# Patient Record
Sex: FEMALE | Race: WHITE
[De-identification: names, ages, dates, MRNs, and addresses within clinical notes are randomized per-mention and may not be internally consistent; named-entity substitution may affect disease eponyms.]

---

## 2019-09-09 NOTE — P.OP_ITS
Operative Date/Time/Diagnoses    
Date of procedure: 09/11/19    
Time of procedure: 14:15    
    
 Procedure & Clinicians    
Procedure: Preoperative diagnoses:    
1.  Right nuclear sclerotic cataract    
2.  Astigmatism which is to be corrected with a toric intraocular lens implant.    
Postoperative diagnoses:    
1.  Cataract removal with phacoemulsification with toric posterior chamber   
intraocular lens implant placed.    
    
Procedure:  Phacoemulsification with posterior chamber toric intraocular lens   
implant.    
    
Surgeon:  Mayra Fisher MD    
    
Complications:  None    
    
Specimen:  None    
    
Implant: [id number] Axis 090    
    
Blood loss:  None    
    
Anesthesia:  Retrobulbar with monitored standby    
    
Description of procedure:  Patient  presents with a complaint of decreased   
vision due to cataract which is affecting activities of daily living. The   
patient wants surgery to improve vision and elects to correct  astigmatism as   
well.    
    
The patient was taken to the operating room and proparacaine drops placed.   
Indelible ink marks were placed at the 90 and 180 degree meridian.  The patient   
was placed on the operating room table and given IV sedation.  A retrobulbar   
block insert consisting of 6 cc of 2% xylocaine without epinephrine mixed half   
and half with 0.5% Marcaine with 1 cc of hyaluronidase added is placed between   
the medial and lateral 1/3 of the inferior orbital rim.The eye is manually   
massaged for 30 sec, prepped using Betadine solution, and draped in the usual   
sterile fashion.    
    
Temporal approach was made, a 1 mm side-port incision was made 90? from the   
proposed corneal wound.  Phenylephrine 1.5% mixed with 1% xylocaine 0.2 cc was   
placed into the anterior chamber. Viscoat followed by Yunier was then placed. A   
2.6 mm clear incision with a 2.6 mm blade was placed at the 170 degree meridian.  
 A 360 degree capsulorrhexis style capsulotomy was then performed with a   
cystitome needle on a Healon.  Hydrodelineation and hydrodissection were   
performed. The phacoemulsification unit is introduced, and sculpting  used to   
groove the central lens. It is then removed in chopping mode.  Epi nucleus is   
removed with epinuclear mode and irrigation aspiration was used to remove the   
peripheral cortex.  The posterior capsule is polished.  The intraocular lens is   
selected, inspected, power confirmed, and placed in the posterior chamber at the  
desired meridian of 90?. The pupil was not constricted.  The wound was stromally  
hydrated and tested for leaks, there was none and it was left sutureless.    
Vigamox 0.1 cc was placed into the anterior chamber. Kenalog 0.2 cc was placed   
in the superior subconjunctival space.  A drop of antibiotic and was placed and   
the eye was patched and shielded. The patient was stable and returned to the   
recovery room in excellent condition.    
    
Dictated by:  Mayra Fisher MD    
    
Copy to:  [facility]

## 2019-09-09 NOTE — P.OP.PRE_ITS
Pre-operative Note    
Interval Note    
History & Physical reviewed/Exam performed by Physician: Yes    
Changes to H&P: No

## 2019-09-09 NOTE — PM.OP.1
Operative Date/Time/Diagnoses
Date of procedure: 09/11/19
Time of procedure: 14:15

Procedure & Clinicians
Procedure: Preoperative diagnoses:
1.  Right nuclear sclerotic cataract
2.  Astigmatism which is to be corrected with a toric intraocular lens implant.
Postoperative diagnoses:
1.  Cataract removal with phacoemulsification with toric posterior chamber intraocular lens implant placed.

Procedure:  Phacoemulsification with posterior chamber toric intraocular lens implant.

Surgeon:  Mayra Fisher MD

Complications:  None

Specimen:  None

Implant: [id number] Axis 090

Blood loss:  None

Anesthesia:  Retrobulbar with monitored standby

Description of procedure:  Patient  presents with a complaint of decreased vision due to cataract which is affecting activities of daily living. The patient wants surgery to improve vision and elects to correct  astigmatism as well.

The patient was taken to the operating room and proparacaine drops placed. Indelible ink marks were placed at the 90 and 180 degree meridian.  The patient was placed on the operating room table and given IV sedation.  A retrobulbar block insert 
consisting of 6 cc of 2% xylocaine without epinephrine mixed half and half with 0.5% Marcaine with 1 cc of hyaluronidase added is placed between the medial and lateral 1/3 of the inferior orbital rim.The eye is manually massaged for 30 sec, prepped 
using Betadine solution, and draped in the usual sterile fashion.

Temporal approach was made, a 1 mm side-port incision was made 90? from the proposed corneal wound.  Phenylephrine 1.5% mixed with 1% xylocaine 0.2 cc was placed into the anterior chamber. Viscoat followed by Yunier was then placed. A 2.6 mm clear 
incision with a 2.6 mm blade was placed at the 170 degree meridian.  A 360 degree capsulorrhexis style capsulotomy was then performed with a cystitome needle on a Healon.  Hydrodelineation and hydrodissection were performed. The phacoemulsification 
unit is introduced, and sculpting  used to groove the central lens. It is then removed in chopping mode.  Epi nucleus is removed with epinuclear mode and irrigation aspiration was used to remove the peripheral cortex.  The posterior capsule is 
polished.  The intraocular lens is selected, inspected, power confirmed, and placed in the posterior chamber at the desired meridian of 90?. The pupil was not constricted.  The wound was stromally hydrated and tested for leaks, there was none and it 
was left sutureless.  Vigamox 0.1 cc was placed into the anterior chamber. Kenalog 0.2 cc was placed in the superior subconjunctival space.  A drop of antibiotic and was placed and the eye was patched and shielded. The patient was stable and 
returned to the recovery room in excellent condition.

Dictated by:  Mayra Fisher MD

Copy to:  [facility]

## 2019-09-11 ENCOUNTER — HOSPITAL ENCOUNTER (OUTPATIENT)
Dept: HOSPITAL 73 - OR | Age: 64
Discharge: HOME | End: 2019-09-11
Payer: COMMERCIAL

## 2019-09-11 VITALS
TEMPERATURE: 97.4 F | HEART RATE: 59 BPM | SYSTOLIC BLOOD PRESSURE: 157 MMHG | RESPIRATION RATE: 16 BRPM | DIASTOLIC BLOOD PRESSURE: 89 MMHG | OXYGEN SATURATION: 100 %

## 2019-09-11 VITALS
RESPIRATION RATE: 16 BRPM | DIASTOLIC BLOOD PRESSURE: 97 MMHG | HEART RATE: 54 BPM | TEMPERATURE: 97.34 F | SYSTOLIC BLOOD PRESSURE: 146 MMHG

## 2019-09-11 VITALS — BODY MASS INDEX: 23.8 KG/M2

## 2019-09-11 DIAGNOSIS — H52.201: ICD-10-CM

## 2019-09-11 DIAGNOSIS — H25.11: Primary | ICD-10-CM

## 2019-09-11 PROCEDURE — 66984 XCAPSL CTRC RMVL W/O ECP: CPT

## 2019-09-11 PROCEDURE — V2787 ASTIGMATISM-CORRECT FUNCTION: HCPCS

## 2019-09-11 RX ADMIN — BALANCED SALT SOLUTION 15 ML: 6.4; .75; .48; .3; 3.9; 1.7 SOLUTION OPHTHALMIC at 16:55

## 2019-09-11 RX ADMIN — MOXIFLOXACIN PF 5 MG: 5 INJECTION, SOLUTION OPHTHALMIC at 16:54

## 2019-09-11 RX ADMIN — PROPARACAINE HYDROCHLORIDE 2 DROPS: 5 SOLUTION/ DROPS OPHTHALMIC at 15:21

## 2019-09-11 RX ADMIN — LIDOCAINE HYDROCHLORIDE 0 ML: 20 INJECTION, SOLUTION INFILTRATION; PERINEURAL at 16:56

## 2019-09-11 RX ADMIN — Medication 3 DROPS: at 15:29

## 2019-09-11 RX ADMIN — BALANCED SALT SOLUTION 0 ML: 6.4; .75; .48; .3; 3.9; 1.7 SOLUTION OPHTHALMIC at 16:55

## 2019-09-11 RX ADMIN — TRIAMCINOLONE ACETONIDE 3 MG: 10 INJECTION, SUSPENSION INTRA-ARTICULAR; INTRALESIONAL at 16:54

## 2019-09-11 RX ADMIN — ERYTHROMYCIN 1 APPLIC: 5 OINTMENT OPHTHALMIC at 16:58

## 2019-09-11 RX ADMIN — Medication 10 MG: at 16:55

## 2019-09-11 RX ADMIN — Medication 0.2 ML: at 16:53

## 2019-09-11 RX ADMIN — SODIUM CHONDROITIN SULFATE / SODIUM HYALURONATE 1.05 ML: 0.55-0.5 INJECTION INTRAOCULAR at 16:54

## 2019-09-21 NOTE — P.OP_ITS
Operative Date/Time/Diagnoses    
Date of procedure: 09/25/19    
Time of procedure: 13:15    
    
 Procedure & Clinicians    
Procedure: Preoperative diagnoses:    
1.  Left Cortical and nuclear sclerotic cataract    
2.  Astigmatism which is to be corrected with a toric intraocular lens implant.    
3. Multiple sclerosis.    
4. Depression    
5. Short axial length.    
Postoperative diagnoses:    
1.  Cataract removal with phacoemulsification with toric posterior chamber   
intraocular lens implant placed.    
    
Procedure:  Phacoemulsification with posterior chamber toric intraocular lens   
implant.    
    
Surgeon:  Mayra Fisher MD    
    
Complications:  None    
    
Specimen:  None    
    
Implant: [id number] axis 086    
    
Blood loss:  None    
    
Anesthesia:  Retrobulbar with monitored standby    
    
Description of procedure:  Patient  presents with a complaint of decreased   
vision due to cataract which is affecting activities of daily living. The   
patient wants surgery to improve vision and astigmatism.    
    
The patient was taken to the operating room and proparacaine drops placed.   
Indelible ink marks were placed at the 90 and 180 degree meridian.  The patient   
was placed on the operating room table and given IV sedation.  A retrobulbar   
block insert consisting of 6 cc of 2% xylocaine without epinephrine mixed half   
and half with 0.5% Marcaine with 1 cc of hyaluronidase added is placed between   
the medial and lateral 1/3 of the inferior orbital rim.    The eye is manually   
massaged for 30 sec, prepped using Betadine solution, and draped in the usual   
sterile fashion.    
    
Temporal approach was made, a 1 mm side-port incision was made 90? from the   
proposed corneal wound.  Phenylephrine 1.5% mixed with 1% xylocaine 0.2 cc was   
placed into the anterior chamber. Viscoat followed by Yunier was then placed. A   
2.6 mm clear incision with a 2.6 mm blade was placed at the 170 degree meridian.  
 A 360 degree capsulorrhexis style capsulotomy was then performed with a   
cystitome needle on a Healon.  Hydrodelineation and hydrodissection were   
performed. The phacoemulsification unit is introduced, and sculpting  used to   
groove the central lens. It is then removed in chopping mode.  Epi nucleus is   
removed with epinuclear mode and irrigation aspiration was used to remove the   
peripheral cortex.  The posterior capsule is polished.  The intraocular lens is   
selected, inspected, power confirmed, and placed in the posterior chamber at the  
desired meridian of 086 degrees. The pupil was not constricted.  The wound was   
stromally hydrated and tested for leaks, there was none and it was left   
sutureless.  Vigamox 0.1 cc was placed into the anterior chamber. Kenalog 0.2 cc  
was placed in the superior subconjunctival space.  A drop of antibiotic and was   
placed and the eye was patched and shielded. The patient was stable and returned  
to the recovery room in excellent condition.    
    
Dictated by:  Mayra Fisher MD    
    
Copy to:  [facility]

## 2019-09-21 NOTE — PM.OP.1
Operative Date/Time/Diagnoses
Date of procedure: 09/25/19
Time of procedure: 13:15

Procedure & Clinicians
Procedure: Preoperative diagnoses:
1.  Left Cortical and nuclear sclerotic cataract
2.  Astigmatism which is to be corrected with a toric intraocular lens implant.
3. Multiple sclerosis.
4. Depression
5. Short axial length.
Postoperative diagnoses:
1.  Cataract removal with phacoemulsification with toric posterior chamber intraocular lens implant placed.

Procedure:  Phacoemulsification with posterior chamber toric intraocular lens implant.

Surgeon:  Mayra Fisher MD

Complications:  None

Specimen:  None

Implant: [id number]+22.5 axis 086

Blood loss:  None

Anesthesia:  Retrobulbar with monitored standby

Description of procedure:  Patient  presents with a complaint of decreased vision due to cataract which is affecting activities of daily living. The patient wants surgery to improve vision and astigmatism.

The patient was taken to the operating room and proparacaine drops placed. Indelible ink marks were placed at the 90 and 180 degree meridian.  The patient was placed on the operating room table and given IV sedation.  A retrobulbar block insert 
consisting of 6 cc of 2% xylocaine without epinephrine mixed half and half with 0.5% Marcaine with 1 cc of hyaluronidase added is placed between the medial and lateral 1/3 of the inferior orbital rim.    The eye is manually massaged for 30 sec, 
prepped using Betadine solution, and draped in the usual sterile fashion.

Temporal approach was made, a 1 mm side-port incision was made 90? from the proposed corneal wound.  Phenylephrine 1.5% mixed with 1% xylocaine 0.2 cc was placed into the anterior chamber. Viscoat followed by Yunier was then placed. A 2.6 mm clear 
incision with a 2.6 mm blade was placed at the 170 degree meridian.  A 360 degree capsulorrhexis style capsulotomy was then performed with a cystitome needle on a Healon.  Hydrodelineation and hydrodissection were performed. The phacoemulsification 
unit is introduced, and sculpting  used to groove the central lens. It is then removed in chopping mode.  Epi nucleus is removed with epinuclear mode and irrigation aspiration was used to remove the peripheral cortex.  The posterior capsule is 
polished.  The intraocular lens is selected, inspected, power confirmed, and placed in the posterior chamber at the desired meridian of 086 degrees. The pupil was not constricted.  The wound was stromally hydrated and tested for leaks, there was 
none and it was left sutureless.  Vigamox 0.1 cc was placed into the anterior chamber. Kenalog 0.2 cc was placed in the superior subconjunctival space.  A drop of antibiotic and was placed and the eye was patched and shielded. The patient was stable 
and returned to the recovery room in excellent condition.

Dictated by:  Mayra Fisher MD

Copy to:  [facility]

## 2019-09-25 ENCOUNTER — HOSPITAL ENCOUNTER (OUTPATIENT)
Dept: HOSPITAL 73 - OR | Age: 64
Discharge: HOME | End: 2019-09-25
Payer: COMMERCIAL

## 2019-09-25 VITALS
DIASTOLIC BLOOD PRESSURE: 73 MMHG | OXYGEN SATURATION: 100 % | SYSTOLIC BLOOD PRESSURE: 130 MMHG | RESPIRATION RATE: 18 BRPM | HEART RATE: 55 BPM | TEMPERATURE: 97.88 F

## 2019-09-25 VITALS
OXYGEN SATURATION: 100 % | SYSTOLIC BLOOD PRESSURE: 149 MMHG | RESPIRATION RATE: 15 BRPM | DIASTOLIC BLOOD PRESSURE: 85 MMHG | TEMPERATURE: 97.9 F | HEART RATE: 59 BPM

## 2019-09-25 VITALS — BODY MASS INDEX: 23.8 KG/M2

## 2019-09-25 DIAGNOSIS — H25.812: Primary | ICD-10-CM

## 2019-09-25 DIAGNOSIS — G35: ICD-10-CM

## 2019-09-25 DIAGNOSIS — F32.9: ICD-10-CM

## 2019-09-25 PROCEDURE — V2787 ASTIGMATISM-CORRECT FUNCTION: HCPCS

## 2019-09-25 PROCEDURE — 66984 XCAPSL CTRC RMVL W/O ECP: CPT

## 2019-09-25 RX ADMIN — LIDOCAINE HYDROCHLORIDE 0 ML: 20 INJECTION, SOLUTION INFILTRATION; PERINEURAL at 13:28

## 2019-09-25 RX ADMIN — PROPARACAINE HYDROCHLORIDE 2 DROPS: 5 SOLUTION/ DROPS OPHTHALMIC at 13:21

## 2019-09-25 RX ADMIN — Medication 0.2 ML: at 13:48

## 2019-09-25 RX ADMIN — Medication 3 DROPS: at 12:15

## 2019-09-25 RX ADMIN — Medication 10 MG: at 13:46

## 2019-09-25 RX ADMIN — PROPARACAINE HYDROCHLORIDE 2 DROPS: 5 SOLUTION/ DROPS OPHTHALMIC at 12:14

## 2019-09-25 RX ADMIN — MOXIFLOXACIN PF 5 MG: 5 INJECTION, SOLUTION OPHTHALMIC at 13:47

## 2019-09-25 RX ADMIN — BALANCED SALT SOLUTION 0 ML: 6.4; .75; .48; .3; 3.9; 1.7 SOLUTION OPHTHALMIC at 13:50

## 2019-09-25 RX ADMIN — TRIAMCINOLONE ACETONIDE 3 MG: 10 INJECTION, SUSPENSION INTRA-ARTICULAR; INTRALESIONAL at 13:48

## 2019-09-25 RX ADMIN — ERYTHROMYCIN 1 APPLIC: 5 OINTMENT OPHTHALMIC at 13:46

## 2019-09-25 RX ADMIN — SODIUM CHONDROITIN SULFATE / SODIUM HYALURONATE 1.05 ML: 0.55-0.5 INJECTION INTRAOCULAR at 13:44

## 2020-08-27 ENCOUNTER — HOSPITAL ENCOUNTER (OUTPATIENT)
Age: 65
End: 2020-08-27
Payer: MEDICARE

## 2020-08-27 DIAGNOSIS — R91.8: Primary | ICD-10-CM

## 2020-08-27 DIAGNOSIS — R93.89: ICD-10-CM

## 2020-08-27 PROCEDURE — 71250 CT THORAX DX C-: CPT

## 2020-08-27 NOTE — DI.CT.S_ITS
PROCEDURE:  CT CHEST WO CON  
   
INDICATIONS:  history nodule  
   
TECHNIQUE:   
Noncontrast 5 mm thick sections acquired from the pulmonary apices to the posterior   
costophrenic angles.  1 mm lung window, 5 mm thick coronal and sagittal and 7 mm axial   
MIP reformats were then acquired.  For radiation dose reduction, the following was used:    
automated exposure control, adjustment of mA and/or kV according to patient size.    
   
COMPARISON:  [facility], CT, CT LOW DOSE LUNG CA SCREENING, 6/26/2019, 11:56.  
   
FINDINGS:    
Image quality:  Excellent.    
   
Lungs and pleura:  No acute air space opacities.  Multiple, small, 2-3 millimeter in   
diameter nodules involving the lungs bilaterally are stable compared to June 26, 2019 in   
size, contour and number.  Reference nodules:  2 millimeter left upper lobe subpleural   
nodule (series 3, image 90) is stable compared to June 26, 2019. 3 millimeter left upper   
lobe subpleural nodule (series 3, image 90) is stable.  2 millimeter left upper lobe   
subpleural nodule (series 3, image 142) is stable.  2 millimeter calcified granuloma in   
the right middle lobe (series 3, image 163) is stable.    
   
No pleural effusions or pneumothorax.  Central and peripheral airways are patent and   
normal in caliber.    
   
Mediastinum:  Heart size is normal.  No pericardial effusion.  No mediastinal adenopathy   
by size criteria.  Thoracic aorta and central pulmonary arteries are normal in size.    
Esophagus is normal in caliber.  No hiatal hernia.    
   
Bones and chest wall:  No suspicious bony lesions.  No vertebral body compression   
fractures.  No axillary or supraclavicular adenopathy by size criteria.  Thyroid gland is   
normal where visualized.    
   
Abdomen:  Visualized upper abdominal solid organs and bowel loops appear normal in the   
absence of contrast.    
   
   
IMPRESSION:    
   
1. Stable examination compared to June 26, 2019.  
   
2. Multiple, small, 2-3 millimeter in diameter bilateral lung nodules are stable in size,   
contour and number.  Nodules have benign appearance consistent with LungRads category 2.   
Continued annual surveillance recommended.    
   
   
Dictated by: Gale Kuo MD, PhD on 8/27/2020 at 12:07       
Approved by: Gale Kuo MD, PhD on 8/27/2020 at 12:32

## 2021-04-12 ENCOUNTER — HOSPITAL ENCOUNTER (OUTPATIENT)
Age: 66
End: 2021-04-12
Payer: MEDICARE

## 2021-04-12 DIAGNOSIS — Z01.812: Primary | ICD-10-CM

## 2021-04-12 LAB
BUN SERPL-MCNC: 12 MG/DL (ref 7–17)
CALCIUM SERPL-MCNC: 9.8 MG/DL (ref 8.4–10.2)
CHLORIDE SERPL-SCNC: 105 MMOL/L (ref 98–107)
CO2 SERPL-SCNC: 29 MMOL/L (ref 22–32)
ESTIMATED GLOMERULAR FILT RATE: > 60 ML/MIN (ref 60–?)
GLUCOSE SERPL-MCNC: 100 MG/DL (ref 80–110)
HEMOLYSIS: < 15 (ref 0–50)
POTASSIUM SERPL-SCNC: 4.3 MMOL/L (ref 3.4–5.1)
SODIUM SERPL-SCNC: 141 MMOL/L (ref 137–145)

## 2021-04-12 PROCEDURE — 80048 BASIC METABOLIC PNL TOTAL CA: CPT

## 2023-07-17 ENCOUNTER — HOSPITAL ENCOUNTER (EMERGENCY)
Age: 68
Discharge: HOME | End: 2023-07-17
Payer: MEDICARE

## 2023-07-17 VITALS
HEART RATE: 63 BPM | TEMPERATURE: 97.34 F | RESPIRATION RATE: 16 BRPM | SYSTOLIC BLOOD PRESSURE: 171 MMHG | DIASTOLIC BLOOD PRESSURE: 86 MMHG | OXYGEN SATURATION: 99 %

## 2023-07-17 VITALS — BODY MASS INDEX: 22.6 KG/M2

## 2023-07-17 DIAGNOSIS — L03.113: Primary | ICD-10-CM

## 2023-07-17 LAB
ADD MANUAL DIFF / SLIDE REVIEW: NO
ALBUMIN SERPL-MCNC: 4.3 G/DL (ref 3.5–5)
ALBUMIN/GLOB SERPL: 1.3 {RATIO} (ref 1–2.8)
ALP SERPL-CCNC: 119 U/L (ref 38–126)
ALT SERPL-CCNC: 63 IU/L (ref ?–35)
BUN SERPL-MCNC: 13 MG/DL (ref 7–17)
CALCIUM SERPL-MCNC: 9.2 MG/DL (ref 8.4–10.2)
CHLORIDE SERPL-SCNC: 102 MMOL/L (ref 98–107)
CO2 SERPL-SCNC: 29 MMOL/L (ref 22–32)
ESTIMATED GLOMERULAR FILT RATE: > 60 ML/MIN (ref 60–?)
GLOBULIN SER CALC-MCNC: 3.4 G/DL (ref 1.7–4.1)
GLUCOSE SERPL-MCNC: 96 MG/DL (ref 80–110)
HEMATOCRIT: 38.1 % (ref 36–46)
HEMOGLOBIN: 13.1 G/DL (ref 12–16)
HEMOLYSIS: < 15 (ref 0–50)
INR PPP: 1.1 (ref 0.9–1.3)
LYMPHOCYTES # SPEC AUTO: 1300 /UL (ref 1100–4500)
MCV RBC: 87.4 FL (ref 80–100)
MEAN CORPUSCULAR HEMOGLOBIN: 30 PG (ref 26–34)
MEAN CORPUSCULAR HGB CONC: 34.3 % (ref 30–36)
PLATELET COUNT: 257 X10^3/UL (ref 150–400)
POTASSIUM SERPL-SCNC: 3.5 MMOL/L (ref 3.4–5.1)
PROT SERPL-MCNC: 7.7 G/DL (ref 6.3–8.2)
PROTHROMBIN TIME: 12.3 SECONDS (ref 10.1–12.7)
PTT PARTIAL THROMBOPLASTIN TIM: 30 SECONDS (ref 26–36)
SODIUM SERPL-SCNC: 137 MMOL/L (ref 137–145)

## 2023-07-17 PROCEDURE — 99284 EMERGENCY DEPT VISIT MOD MDM: CPT

## 2023-07-17 PROCEDURE — 85025 COMPLETE CBC W/AUTO DIFF WBC: CPT

## 2023-07-17 PROCEDURE — 73130 X-RAY EXAM OF HAND: CPT

## 2023-07-17 PROCEDURE — 80053 COMPREHEN METABOLIC PANEL: CPT

## 2023-07-17 PROCEDURE — 99283 EMERGENCY DEPT VISIT LOW MDM: CPT

## 2023-07-17 PROCEDURE — 85730 THROMBOPLASTIN TIME PARTIAL: CPT

## 2023-07-17 PROCEDURE — 85651 RBC SED RATE NONAUTOMATED: CPT

## 2023-07-17 PROCEDURE — 85610 PROTHROMBIN TIME: CPT

## 2023-07-17 PROCEDURE — 86140 C-REACTIVE PROTEIN: CPT

## 2024-03-04 ENCOUNTER — HOSPITAL ENCOUNTER (OUTPATIENT)
Age: 69
End: 2024-03-04
Payer: MEDICARE

## 2024-03-04 DIAGNOSIS — R03.0: ICD-10-CM

## 2024-03-04 DIAGNOSIS — E03.9: ICD-10-CM

## 2024-03-04 DIAGNOSIS — I47.10: Primary | ICD-10-CM

## 2024-03-04 LAB
ALBUMIN SERPL-MCNC: 4.2 G/DL (ref 3.5–5)
ALBUMIN/GLOB SERPL: 1.4 {RATIO} (ref 1–2.8)
ALP SERPL-CCNC: 78 U/L (ref 38–126)
ALT SERPL-CCNC: 23 IU/L (ref ?–35)
BUN SERPL-MCNC: 13 MG/DL (ref 7–17)
CALCIUM SERPL-MCNC: 9.7 MG/DL (ref 8.4–10.2)
CHLORIDE SERPL-SCNC: 107 MMOL/L (ref 98–107)
CO2 SERPL-SCNC: 30 MMOL/L (ref 22–32)
ESTIMATED GLOMERULAR FILT RATE: > 60 ML/MIN (ref 60–?)
GLOBULIN SER CALC-MCNC: 3 G/DL (ref 1.7–4.1)
GLUCOSE SERPL-MCNC: 88 MG/DL (ref 80–110)
HEMOLYSIS: < 15 (ref 0–50)
POTASSIUM SERPL-SCNC: 4.1 MMOL/L (ref 3.4–5.1)
PROT SERPL-MCNC: 7.2 G/DL (ref 6.3–8.2)
SODIUM SERPL-SCNC: 141 MMOL/L (ref 137–145)
T3FREE SERPL-MCNC: 3.35 PG/ML (ref 2.77–5.27)
T4 FREE SERPL-MCNC: 1.18 NG/DL (ref 0.78–2.19)
TSH SERPL DL<=0.05 MIU/L-ACNC: 0.1 UIU/ML (ref 0.47–4.68)

## 2024-03-04 PROCEDURE — 36415 COLL VENOUS BLD VENIPUNCTURE: CPT

## 2024-03-04 PROCEDURE — 84481 FREE ASSAY (FT-3): CPT

## 2024-03-04 PROCEDURE — 80053 COMPREHEN METABOLIC PANEL: CPT

## 2024-03-04 PROCEDURE — 84443 ASSAY THYROID STIM HORMONE: CPT

## 2024-03-04 PROCEDURE — 84439 ASSAY OF FREE THYROXINE: CPT

## 2025-05-09 ENCOUNTER — HOSPITAL ENCOUNTER (OUTPATIENT)
Age: 70
End: 2025-05-09
Payer: MEDICARE

## 2025-05-09 DIAGNOSIS — R41.89: Primary | ICD-10-CM

## 2025-05-09 DIAGNOSIS — R23.2: ICD-10-CM

## 2025-05-09 LAB
CORTIS SERPL-MCNC: 11.7 UG/DL (ref 4.46–22.7)
ESTRADIOL SERPL-MCNC: 14.1 PG/ML

## 2025-05-09 PROCEDURE — 82533 TOTAL CORTISOL: CPT

## 2025-05-09 PROCEDURE — 82024 ASSAY OF ACTH: CPT

## 2025-05-09 PROCEDURE — 82670 ASSAY OF TOTAL ESTRADIOL: CPT
